# Patient Record
Sex: MALE | NOT HISPANIC OR LATINO | Employment: FULL TIME | URBAN - NONMETROPOLITAN AREA
[De-identification: names, ages, dates, MRNs, and addresses within clinical notes are randomized per-mention and may not be internally consistent; named-entity substitution may affect disease eponyms.]

---

## 2017-09-20 ENCOUNTER — NON-PROVIDER VISIT (OUTPATIENT)
Dept: URGENT CARE | Facility: PHYSICIAN GROUP | Age: 22
End: 2017-09-20

## 2017-09-20 DIAGNOSIS — Z02.1 DRUG SCREENING, PRE-EMPLOYMENT: ICD-10-CM

## 2017-09-20 LAB
BREATH ALCOHOL COMMENT: NORMAL
POC BREATHALIZER: 0 PERCENT (ref 0–0.01)

## 2017-09-20 PROCEDURE — 8907 PR URINE COLLECT ONLY: Performed by: PHYSICIAN ASSISTANT

## 2017-09-20 PROCEDURE — 82075 ASSAY OF BREATH ETHANOL: CPT | Performed by: PHYSICIAN ASSISTANT

## 2018-01-26 ENCOUNTER — OFFICE VISIT (OUTPATIENT)
Dept: URGENT CARE | Facility: PHYSICIAN GROUP | Age: 23
End: 2018-01-26
Payer: COMMERCIAL

## 2018-01-26 VITALS
BODY MASS INDEX: 24.38 KG/M2 | WEIGHT: 180 LBS | TEMPERATURE: 98.6 F | DIASTOLIC BLOOD PRESSURE: 76 MMHG | HEART RATE: 77 BPM | HEIGHT: 72 IN | SYSTOLIC BLOOD PRESSURE: 124 MMHG | RESPIRATION RATE: 16 BRPM | OXYGEN SATURATION: 98 %

## 2018-01-26 DIAGNOSIS — J01.40 ACUTE PANSINUSITIS, RECURRENCE NOT SPECIFIED: ICD-10-CM

## 2018-01-26 DIAGNOSIS — R05.9 COUGH: ICD-10-CM

## 2018-01-26 PROCEDURE — 99203 OFFICE O/P NEW LOW 30 MIN: CPT | Performed by: PHYSICIAN ASSISTANT

## 2018-01-26 RX ORDER — FLUTICASONE PROPIONATE 50 MCG
2 SPRAY, SUSPENSION (ML) NASAL DAILY
Qty: 1 BOTTLE | Refills: 1 | Status: SHIPPED | OUTPATIENT
Start: 2018-01-26 | End: 2018-10-01 | Stop reason: CLARIF

## 2018-01-26 RX ORDER — AMOXICILLIN AND CLAVULANATE POTASSIUM 875; 125 MG/1; MG/1
1 TABLET, FILM COATED ORAL 2 TIMES DAILY
Qty: 14 TAB | Refills: 0 | Status: SHIPPED | OUTPATIENT
Start: 2018-01-26 | End: 2018-02-02

## 2018-01-26 ASSESSMENT — ENCOUNTER SYMPTOMS
COUGH: 1
MYALGIAS: 0
SORE THROAT: 1
SHORTNESS OF BREATH: 0
RHINORRHEA: 1
CHILLS: 0
FEVER: 0
WHEEZING: 0
SINUS PAIN: 1

## 2018-01-26 NOTE — PATIENT INSTRUCTIONS
Use Tylenol and/or ibuprofen as needed for pain or fever.  Use a Kane Med, Neti Pot, or other nasal irrigation device daily.  Use Flonase daily.  May try a short course of a decongestant such as Sudafed.  May try Afrin nasal spray for 3-5 days and then discontinue use.  May try an over-the-counter antihistamine such as Zyrtec, Claritin, or Allegra.  Use a cool mist humidifier with distilled water.  Elevate the head of your bed a few inches.  Drink plenty of fluids and get adequate rest.  Follow up with primary care provider in a few days if not improving.  Return for new or worsening symptoms.      Sinusitis, Adult  Sinusitis is redness, soreness, and inflammation of the paranasal sinuses. Paranasal sinuses are air pockets within the bones of your face. They are located beneath your eyes, in the middle of your forehead, and above your eyes. In healthy paranasal sinuses, mucus is able to drain out, and air is able to circulate through them by way of your nose. However, when your paranasal sinuses are inflamed, mucus and air can become trapped. This can allow bacteria and other germs to grow and cause infection.  Sinusitis can develop quickly and last only a short time (acute) or continue over a long period (chronic). Sinusitis that lasts for more than 12 weeks is considered chronic.  CAUSES  Causes of sinusitis include:  · Allergies.  · Structural abnormalities, such as displacement of the cartilage that separates your nostrils (deviated septum), which can decrease the air flow through your nose and sinuses and affect sinus drainage.  · Functional abnormalities, such as when the small hairs (cilia) that line your sinuses and help remove mucus do not work properly or are not present.  SIGNS AND SYMPTOMS  Symptoms of acute and chronic sinusitis are the same. The primary symptoms are pain and pressure around the affected sinuses. Other symptoms include:  · Upper toothache.  · Earache.  · Headache.  · Bad  breath.  · Decreased sense of smell and taste.  · A cough, which worsens when you are lying flat.  · Fatigue.  · Fever.  · Thick drainage from your nose, which often is green and may contain pus (purulent).  · Swelling and warmth over the affected sinuses.  DIAGNOSIS  Your health care provider will perform a physical exam. During your exam, your health care provider may perform any of the following to help determine if you have acute sinusitis or chronic sinusitis:  · Look in your nose for signs of abnormal growths in your nostrils (nasal polyps).  · Tap over the affected sinus to check for signs of infection.  · View the inside of your sinuses using an imaging device that has a light attached (endoscope).  If your health care provider suspects that you have chronic sinusitis, one or more of the following tests may be recommended:  · Allergy tests.  · Nasal culture. A sample of mucus is taken from your nose, sent to a lab, and screened for bacteria.  · Nasal cytology. A sample of mucus is taken from your nose and examined by your health care provider to determine if your sinusitis is related to an allergy.  TREATMENT  Most cases of acute sinusitis are related to a viral infection and will resolve on their own within 10 days. Sometimes, medicines are prescribed to help relieve symptoms of both acute and chronic sinusitis. These may include pain medicines, decongestants, nasal steroid sprays, or saline sprays.  However, for sinusitis related to a bacterial infection, your health care provider will prescribe antibiotic medicines. These are medicines that will help kill the bacteria causing the infection.  Rarely, sinusitis is caused by a fungal infection. In these cases, your health care provider will prescribe antifungal medicine.  For some cases of chronic sinusitis, surgery is needed. Generally, these are cases in which sinusitis recurs more than 3 times per year, despite other treatments.  HOME CARE  INSTRUCTIONS  · Drink plenty of water. Water helps thin the mucus so your sinuses can drain more easily.  · Use a humidifier.  · Inhale steam 3-4 times a day (for example, sit in the bathroom with the shower running).  · Apply a warm, moist washcloth to your face 3-4 times a day, or as directed by your health care provider.  · Use saline nasal sprays to help moisten and clean your sinuses.  · Take medicines only as directed by your health care provider.  · If you were prescribed either an antibiotic or antifungal medicine, finish it all even if you start to feel better.  SEEK IMMEDIATE MEDICAL CARE IF:  · You have increasing pain or severe headaches.  · You have nausea, vomiting, or drowsiness.  · You have swelling around your face.  · You have vision problems.  · You have a stiff neck.  · You have difficulty breathing.     This information is not intended to replace advice given to you by your health care provider. Make sure you discuss any questions you have with your health care provider.     Document Released: 12/18/2006 Document Revised: 01/08/2016 Document Reviewed: 01/01/2013  [employer] Interactive Patient Education ©2016 [employer]  Cough, Adult   A cough is a reflex that helps clear your throat and airways. It can help heal the body or may be a reaction to an irritated airway. A cough may only last 2 or 3 weeks (acute) or may last more than 8 weeks (chronic).   CAUSES  Acute cough:  · Viral or bacterial infections.  Chronic cough:  · Infections.  · Allergies.  · Asthma.  · Post-nasal drip.  · Smoking.  · Heartburn or acid reflux.  · Some medicines.  · Chronic lung problems (COPD).  · Cancer.  SYMPTOMS   · Cough.  · Fever.  · Chest pain.  · Increased breathing rate.  · High-pitched whistling sound when breathing (wheezing).  · Colored mucus that you cough up (sputum).  TREATMENT   · A bacterial cough may be treated with antibiotic medicine.  · A viral cough must run its course and will not respond to  antibiotics.  · Your caregiver may recommend other treatments if you have a chronic cough.  HOME CARE INSTRUCTIONS   · Only take over-the-counter or prescription medicines for pain, discomfort, or fever as directed by your caregiver. Use cough suppressants only as directed by your caregiver.  · Use a cold steam vaporizer or humidifier in your bedroom or home to help loosen secretions.  · Sleep in a semi-upright position if your cough is worse at night.  · Rest as needed.  · Stop smoking if you smoke.  SEEK IMMEDIATE MEDICAL CARE IF:   · You have pus in your sputum.  · Your cough starts to worsen.  · You cannot control your cough with suppressants and are losing sleep.  · You begin coughing up blood.  · You have difficulty breathing.  · You develop pain which is getting worse or is uncontrolled with medicine.  · You have a fever.  MAKE SURE YOU:   · Understand these instructions.  · Will watch your condition.  · Will get help right away if you are not doing well or get worse.     This information is not intended to replace advice given to you by your health care provider. Make sure you discuss any questions you have with your health care provider.     Document Released: 06/15/2012 Document Revised: 03/11/2013 Document Reviewed: 02/24/2016  [employer] Interactive Patient Education ©2016 [employer]

## 2018-01-26 NOTE — PROGRESS NOTES
Subjective:      Jacky Nolan is a 22 y.o. male who presents with Cough (x1mon congestion, headache, note for work)            One-month history of rhinorrhea, congestion, cough. Also reports significant sinus pressure/pain and sore throat. Symptoms fluctuating not improving. He needs a note for work.      Cough   This is a new problem. The current episode started 1 to 4 weeks ago. The problem has been waxing and waning. The cough is non-productive. Associated symptoms include nasal congestion, rhinorrhea and a sore throat. Pertinent negatives include no chills, fever, myalgias, shortness of breath or wheezing. Nothing aggravates the symptoms. He has tried nothing for the symptoms. The treatment provided no relief.       Review of Systems   Constitutional: Negative for chills and fever.   HENT: Positive for congestion, rhinorrhea, sinus pain and sore throat.    Respiratory: Positive for cough. Negative for shortness of breath and wheezing.    Musculoskeletal: Negative for myalgias.     Allergies:Patient has no known allergies.    Current Outpatient Prescriptions Ordered in [facility]   Medication Sig Dispense Refill   • amoxicillin-clavulanate (AUGMENTIN) 875-125 MG Tab Take 1 Tab by mouth 2 times a day for 7 days. 14 Tab 0   • fluticasone (FLONASE) 50 MCG/ACT nasal spray Spray 2 Sprays in nose every day. 1 Bottle 1     No current Epic-ordered facility-administered medications on file.        History reviewed. No pertinent past medical history.    Social History   Substance Use Topics   • Smoking status: Never Smoker   • Smokeless tobacco: Current User     Types: Chew   • Alcohol use No       No family status information on file.   History reviewed. No pertinent family history.       Objective:     /76   Pulse 77   Temp 37 °C (98.6 °F)   Resp 16   Ht 1.829 m (6')   Wt 81.6 kg (180 lb)   SpO2 98%   BMI 24.41 kg/m²      Physical Exam   Constitutional: He is oriented to person, place, and time. He appears  well-developed and well-nourished. No distress.   HENT:   Head: Normocephalic and atraumatic.   Right Ear: External ear normal.   Left Ear: External ear normal.   Canals and tympanic membranes unremarkable. Nasal mucosal edema frontal/macular sinus tenderness. Posterior pharynx is erythematous, edematous, with exudate   Eyes: Right eye exhibits no discharge. Left eye exhibits no discharge.   Neck: Normal range of motion. Neck supple.   Cardiovascular: Normal rate and regular rhythm.    Pulmonary/Chest: Effort normal and breath sounds normal. He has no wheezes. He has no rales.   Nonproductive cough   Lymphadenopathy:     He has no cervical adenopathy.   Neurological: He is alert and oriented to person, place, and time.   Skin: Skin is warm and dry. He is not diaphoretic.   Psychiatric: He has a normal mood and affect. His behavior is normal. Judgment and thought content normal.   Nursing note and vitals reviewed.              Assessment/Plan:     1. Acute pansinusitis, recurrence not specified  amoxicillin-clavulanate (AUGMENTIN) 875-125 MG Tab    fluticasone (FLONASE) 50 MCG/ACT nasal spray    Ongoing for a month. Nasal mucosal edema with frontal/macular sinus tenderness. Start Augmentin, Flonase, irrigation. Follow-up PCP   2. Cough      Fluctuating for a couple of weeks. Lungs clear. Given written instructions. Follow-up with PCP as needed       [employer] Interactive Patient Education given: Sinusitis    Use Tylenol and/or ibuprofen as needed for pain or fever.  Use a Kane Med, Neti Pot, or other nasal irrigation device daily.  Use Flonase daily.  May try a short course of a decongestant such as Sudafed.  May try Afrin nasal spray for 3-5 days and then discontinue use.  May try an over-the-counter antihistamine such as Zyrtec, Claritin, or Allegra.  Use a cool mist humidifier with distilled water.  Elevate the head of your bed a few inches.  Drink plenty of fluids and get adequate rest.  Follow up with primary  care provider in a few days if not improving.  Return for new or worsening symptoms.      Please note that this dictation was created using voice recognition software. I have made every reasonable attempt to correct obvious errors, but I expect that there are errors of grammar and possibly content that I did not discover before finalizing the note.

## 2018-01-26 NOTE — LETTER
January 26, 2018         Patient: Jacky Nolan   YOB: 1995   Date of Visit: 1/26/2018           To Whom it May Concern:    Jacky Nolan was seen in my clinic on 1/26/2018. Please excuse from work today and tomorrow due to illness. He may return when symptoms improve.    If you have any questions or concerns, please don't hesitate to call.        Sincerely,           Zaheer Self PA-C  Electronically Signed

## 2018-10-01 ENCOUNTER — HOSPITAL ENCOUNTER (EMERGENCY)
Facility: MEDICAL CENTER | Age: 23
End: 2018-10-02
Attending: EMERGENCY MEDICINE
Payer: COMMERCIAL

## 2018-10-01 DIAGNOSIS — R10.13 EPIGASTRIC PAIN: ICD-10-CM

## 2018-10-01 LAB — EKG IMPRESSION: NORMAL

## 2018-10-01 PROCEDURE — 93005 ELECTROCARDIOGRAM TRACING: CPT

## 2018-10-01 PROCEDURE — 36415 COLL VENOUS BLD VENIPUNCTURE: CPT

## 2018-10-01 PROCEDURE — 93005 ELECTROCARDIOGRAM TRACING: CPT | Performed by: EMERGENCY MEDICINE

## 2018-10-01 PROCEDURE — 83690 ASSAY OF LIPASE: CPT

## 2018-10-01 PROCEDURE — 99285 EMERGENCY DEPT VISIT HI MDM: CPT

## 2018-10-01 PROCEDURE — 80053 COMPREHEN METABOLIC PANEL: CPT

## 2018-10-01 PROCEDURE — 85025 COMPLETE CBC W/AUTO DIFF WBC: CPT

## 2018-10-01 ASSESSMENT — PAIN SCALES - GENERAL: PAINLEVEL_OUTOF10: 10

## 2018-10-02 ENCOUNTER — APPOINTMENT (OUTPATIENT)
Dept: RADIOLOGY | Facility: MEDICAL CENTER | Age: 23
End: 2018-10-02
Attending: EMERGENCY MEDICINE
Payer: COMMERCIAL

## 2018-10-02 VITALS
SYSTOLIC BLOOD PRESSURE: 121 MMHG | WEIGHT: 171.08 LBS | BODY MASS INDEX: 23.17 KG/M2 | RESPIRATION RATE: 19 BRPM | HEIGHT: 72 IN | OXYGEN SATURATION: 90 % | HEART RATE: 47 BPM | DIASTOLIC BLOOD PRESSURE: 63 MMHG | TEMPERATURE: 97.2 F

## 2018-10-02 LAB
ALBUMIN SERPL BCP-MCNC: 4.8 G/DL (ref 3.2–4.9)
ALBUMIN/GLOB SERPL: 1.7 G/DL
ALP SERPL-CCNC: 57 U/L (ref 30–99)
ALT SERPL-CCNC: 14 U/L (ref 2–50)
ANION GAP SERPL CALC-SCNC: 11 MMOL/L (ref 0–11.9)
AST SERPL-CCNC: 22 U/L (ref 12–45)
BASOPHILS # BLD AUTO: 1 % (ref 0–1.8)
BASOPHILS # BLD: 0.11 K/UL (ref 0–0.12)
BILIRUB SERPL-MCNC: 0.7 MG/DL (ref 0.1–1.5)
BUN SERPL-MCNC: 26 MG/DL (ref 8–22)
CALCIUM SERPL-MCNC: 9.7 MG/DL (ref 8.5–10.5)
CHLORIDE SERPL-SCNC: 105 MMOL/L (ref 96–112)
CO2 SERPL-SCNC: 25 MMOL/L (ref 20–33)
CREAT SERPL-MCNC: 1.46 MG/DL (ref 0.5–1.4)
EOSINOPHIL # BLD AUTO: 0.52 K/UL (ref 0–0.51)
EOSINOPHIL NFR BLD: 4.5 % (ref 0–6.9)
ERYTHROCYTE [DISTWIDTH] IN BLOOD BY AUTOMATED COUNT: 42.4 FL (ref 35.9–50)
GLOBULIN SER CALC-MCNC: 2.8 G/DL (ref 1.9–3.5)
GLUCOSE SERPL-MCNC: 94 MG/DL (ref 65–99)
HCT VFR BLD AUTO: 45.4 % (ref 42–52)
HGB BLD-MCNC: 15.8 G/DL (ref 14–18)
IMM GRANULOCYTES # BLD AUTO: 0.06 K/UL (ref 0–0.11)
IMM GRANULOCYTES NFR BLD AUTO: 0.5 % (ref 0–0.9)
LIPASE SERPL-CCNC: 31 U/L (ref 11–82)
LYMPHOCYTES # BLD AUTO: 1.75 K/UL (ref 1–4.8)
LYMPHOCYTES NFR BLD: 15.2 % (ref 22–41)
MCH RBC QN AUTO: 30.4 PG (ref 27–33)
MCHC RBC AUTO-ENTMCNC: 34.8 G/DL (ref 33.7–35.3)
MCV RBC AUTO: 87.3 FL (ref 81.4–97.8)
MONOCYTES # BLD AUTO: 0.65 K/UL (ref 0–0.85)
MONOCYTES NFR BLD AUTO: 5.6 % (ref 0–13.4)
NEUTROPHILS # BLD AUTO: 8.43 K/UL (ref 1.82–7.42)
NEUTROPHILS NFR BLD: 73.2 % (ref 44–72)
NRBC # BLD AUTO: 0.05 K/UL
NRBC BLD-RTO: 0.4 /100 WBC
PLATELET # BLD AUTO: 182 K/UL (ref 164–446)
PMV BLD AUTO: 11.7 FL (ref 9–12.9)
POTASSIUM SERPL-SCNC: 3.9 MMOL/L (ref 3.6–5.5)
PROT SERPL-MCNC: 7.6 G/DL (ref 6–8.2)
RBC # BLD AUTO: 5.2 M/UL (ref 4.7–6.1)
SODIUM SERPL-SCNC: 141 MMOL/L (ref 135–145)
WBC # BLD AUTO: 11.5 K/UL (ref 4.8–10.8)

## 2018-10-02 PROCEDURE — 700111 HCHG RX REV CODE 636 W/ 250 OVERRIDE (IP): Performed by: EMERGENCY MEDICINE

## 2018-10-02 PROCEDURE — A9270 NON-COVERED ITEM OR SERVICE: HCPCS | Performed by: EMERGENCY MEDICINE

## 2018-10-02 PROCEDURE — 74022 RADEX COMPL AQT ABD SERIES: CPT

## 2018-10-02 PROCEDURE — 96374 THER/PROPH/DIAG INJ IV PUSH: CPT

## 2018-10-02 PROCEDURE — 700102 HCHG RX REV CODE 250 W/ 637 OVERRIDE(OP): Performed by: EMERGENCY MEDICINE

## 2018-10-02 RX ORDER — ONDANSETRON 4 MG/1
4 TABLET, ORALLY DISINTEGRATING ORAL EVERY 6 HOURS PRN
Qty: 20 TAB | Refills: 0 | Status: SHIPPED | OUTPATIENT
Start: 2018-10-02

## 2018-10-02 RX ORDER — FAMOTIDINE 20 MG/1
20 TABLET, FILM COATED ORAL ONCE
Status: COMPLETED | OUTPATIENT
Start: 2018-10-02 | End: 2018-10-02

## 2018-10-02 RX ORDER — OMEPRAZOLE 20 MG/1
20 CAPSULE, DELAYED RELEASE ORAL DAILY
Qty: 30 CAP | Refills: 0 | Status: SHIPPED | OUTPATIENT
Start: 2018-10-02

## 2018-10-02 RX ORDER — OMEPRAZOLE 20 MG/1
20 CAPSULE, DELAYED RELEASE ORAL DAILY
Qty: 30 CAP | Refills: 0 | Status: SHIPPED | OUTPATIENT
Start: 2018-10-02 | End: 2018-10-02

## 2018-10-02 RX ORDER — ONDANSETRON 2 MG/ML
4 INJECTION INTRAMUSCULAR; INTRAVENOUS ONCE
Status: COMPLETED | OUTPATIENT
Start: 2018-10-02 | End: 2018-10-02

## 2018-10-02 RX ORDER — DIPHENHYDRAMINE HCL 25 MG
50 TABLET ORAL ONCE
Status: COMPLETED | OUTPATIENT
Start: 2018-10-02 | End: 2018-10-02

## 2018-10-02 RX ORDER — ALUMINA, MAGNESIA, AND SIMETHICONE 2400; 2400; 240 MG/30ML; MG/30ML; MG/30ML
10 SUSPENSION ORAL 4 TIMES DAILY PRN
Qty: 560 ML | Refills: 0 | Status: SHIPPED | OUTPATIENT
Start: 2018-10-02

## 2018-10-02 RX ADMIN — LIDOCAINE HYDROCHLORIDE 30 ML: 20 SOLUTION OROPHARYNGEAL at 01:26

## 2018-10-02 RX ADMIN — DIPHENHYDRAMINE HCL 50 MG: 25 TABLET ORAL at 00:45

## 2018-10-02 RX ADMIN — FAMOTIDINE 20 MG: 20 TABLET, FILM COATED ORAL at 00:45

## 2018-10-02 RX ADMIN — ONDANSETRON 4 MG: 2 INJECTION INTRAMUSCULAR; INTRAVENOUS at 00:45

## 2018-10-02 RX ADMIN — LIDOCAINE HYDROCHLORIDE 30 ML: 20 SOLUTION OROPHARYNGEAL at 00:44

## 2018-10-02 ASSESSMENT — ENCOUNTER SYMPTOMS
VOMITING: 1
ABDOMINAL PAIN: 1
HEADACHES: 0
CHILLS: 0
NAUSEA: 1
SORE THROAT: 0
FEVER: 0
SHORTNESS OF BREATH: 0

## 2018-10-02 ASSESSMENT — LIFESTYLE VARIABLES
TOTAL SCORE: 0
DO YOU DRINK ALCOHOL: YES
TOTAL SCORE: 0
CONSUMPTION TOTAL: INCOMPLETE
HAVE YOU EVER FELT YOU SHOULD CUT DOWN ON YOUR DRINKING: NO
EVER HAD A DRINK FIRST THING IN THE MORNING TO STEADY YOUR NERVES TO GET RID OF A HANGOVER: NO
EVER FELT BAD OR GUILTY ABOUT YOUR DRINKING: NO
HAVE PEOPLE ANNOYED YOU BY CRITICIZING YOUR DRINKING: NO
TOTAL SCORE: 0

## 2018-10-02 NOTE — ED TRIAGE NOTES
"Chief Complaint   Patient presents with   • Epigastric Pain     Began approx 1 hr ago, radiating to chest, burning/sharp pain. Pt being actively treated for stomach ulcers, \"it feels different though because of how much pain I'm in.\"    • Tired     \"I can't open my eyes without feeling like I'm going to pass out.\"      /63   Pulse 61   Temp 36.2 °C (97.2 °F)   Resp 18   Ht 1.829 m (6')   Wt 77.6 kg (171 lb 1.2 oz)   SpO2 100%   BMI 23.20 kg/m²     Pt to triage via WC, able to stand for weight. EKG completed. In obvious discomfort, sitting with eyes closed and grimacing. Lab protocol ordered and with tech to have drawn.   "

## 2018-10-02 NOTE — DISCHARGE INSTRUCTIONS
Your kidney function tests were elevated because of dehydration, please have this rechecked by PCP

## 2018-10-02 NOTE — ED PROVIDER NOTES
"ED Provider Note    Scribed for Megan Loaiza M.D. by Jennifer Pritchard. 10/1/2018, 11:58 PM.    Primary care provider: Pcp Pt States None  Means of arrival: wheel chair  History obtained from: patient  History limited by: none    CHIEF COMPLAINT  Chief Complaint   Patient presents with   • Epigastric Pain     Began approx 1 hr ago, radiating to chest, burning/sharp pain. Pt being actively treated for stomach ulcers, \"it feels different though because of how much pain I'm in.\"    • Tired     \"I can't open my eyes without feeling like I'm going to pass out.\"        HPI  Jacky Nolan is a 23 y.o. male  who presents to the Emergency Department with epigastric pain onset 2 hours ago. The patient states that he went to his PCP for similar pain before and was diagnosed with stomach ulcers a couple of weeks ago. He was empirically started on antibiotics for H. pylori   However has been unable to tolerate them the past few days secondary to nausea and vomiting. He states the pain is in his epigastric region and is burning and moderate in severity.  He denies fever, chills, any recent surgeries, or medications.     REVIEW OF SYSTEMS  Review of Systems   Constitutional: Negative for chills and fever.   HENT: Negative for sore throat.    Respiratory: Negative for shortness of breath.    Cardiovascular: Negative for chest pain.   Gastrointestinal: Positive for abdominal pain, nausea and vomiting.   Neurological: Negative for headaches.     All other systems negative.    PAST MEDICAL HISTORY   Stomach ulcers and asthma.     SURGICAL HISTORY  patient denies any surgical history    SOCIAL HISTORY  Social History   Substance Use Topics   • Smoking status: Never Smoker   • Smokeless tobacco: Current User     Types: Chew   • Alcohol use Yes      Comment: socially      History   Drug Use     Comment: 3 yrs ago use of pills       FAMILY HISTORY  History reviewed. No pertinent family history.    CURRENT MEDICATIONS  Unknown " Antibiotics    ALLERGIES  No Known Allergies    PHYSICAL EXAM  VITAL SIGNS: /63   Pulse 61   Temp 36.2 °C (97.2 °F)   Resp 18   Ht 1.829 m (6')   Wt 77.6 kg (171 lb 1.2 oz)   SpO2 100%   BMI 23.20 kg/m²   Vitals reviewed by myself.  Physical Exam   Nursing note and vitals reviewed.  Constitutional: Well-developed and well-nourished. No acute distress.   HENT: Head is normocephalic and atraumatic.  Eyes: extra-ocular movements intact  Cardiovascular: Normal rate and regular rhythm. No murmur heard.  Pulmonary/Chest: Breath sounds normal. No wheezes or rales.   Abdominal: Soft with mild epigastric tenderness. No distention.  Negative Loaiza's sign, no rebound, no guarding  Musculoskeletal: Extremities exhibit normal range of motion without edema or tenderness.   Neurological: Awake and alert  Skin: Skin is warm and dry. No rash.     DIAGNOSTIC STUDIES /  LABS  Labs Reviewed   CBC WITH DIFFERENTIAL - Abnormal; Notable for the following:        Result Value    WBC 11.5 (*)     Neutrophils-Polys 73.20 (*)     Lymphocytes 15.20 (*)     Neutrophils (Absolute) 8.43 (*)     Eos (Absolute) 0.52 (*)     All other components within normal limits   COMP METABOLIC PANEL - Abnormal; Notable for the following:     Bun 26 (*)     Creatinine 1.46 (*)     All other components within normal limits   LIPASE   ESTIMATED GFR       All labs reviewed by me.        RADIOLOGY  DX-ABDOMEN COMPLETE WITH AP OR PA CXR   Final Result         1.  No acute cardiopulmonary disease is evident.   2.  Nonspecific bowel gas pattern.        The radiologist's interpretation of all radiological studies have been reviewed by me.        COURSE & MEDICAL DECISION MAKING  Nursing notes, VS, PMSFHx reviewed in chart.    Patient is a 23 year old male who comes in for abdominal pain. Differential diagnosis includes PUD, gastritis, pancreatitis, cholecystitis, cholelithiasis, perforated viscous. Diagnostic work-up includes labs and abdominal  xray.    On initial assessment patient is well appearing with mild epigastric tenderness, vitals are within normal limits. He is treated with GI cocktail, zofran and famotidine. Xray returns and demonstrates no free air. Labs are unremarkable. Lipase is within normal limits. Upon reassessment patient is feeling improved, tolerating oral intake and repeat abdominal exam is benign. I believe cholecystitis is unlikely given normal exam and patient's improvement in symptoms. As he has had this ongoing epigastric pain I advised him that this is likely gastritis versus PUD, and he should follow-up with gastroenterologist for definitive management. In the meantime I have advised him to start daily omeprazole, I have also provided prescriptions for Zofran and Maalox for breakthrough symptoms. Patient is agreeable to this plan. He is then given strict return precautions and discharged home in stable condition.       FINAL IMPRESSION  1. Epigastric pain Active         Jennifer HADLEY (Joseibstar), am scribing for, and in the presence of, Megan Loaiza M.D..    Electronically signed by: Jennifer Pritchard (Wendie), 10/1/2018    Megan HADLEY M.D. personally performed the services described in this documentation, as scribed by Jennifer Pritchard in my presence, and it is both accurate and complete. C.    The note accurately reflects work and decisions made by me.  Megan Loaiza  10/2/2018  1:15 PM

## 2018-10-05 ENCOUNTER — APPOINTMENT (OUTPATIENT)
Dept: RADIOLOGY | Facility: MEDICAL CENTER | Age: 23
End: 2018-10-05
Attending: EMERGENCY MEDICINE
Payer: COMMERCIAL

## 2018-10-05 ENCOUNTER — HOSPITAL ENCOUNTER (EMERGENCY)
Facility: MEDICAL CENTER | Age: 23
End: 2018-10-05
Attending: EMERGENCY MEDICINE
Payer: COMMERCIAL

## 2018-10-05 VITALS
TEMPERATURE: 97.2 F | BODY MASS INDEX: 22.38 KG/M2 | OXYGEN SATURATION: 96 % | HEIGHT: 73 IN | SYSTOLIC BLOOD PRESSURE: 111 MMHG | HEART RATE: 57 BPM | DIASTOLIC BLOOD PRESSURE: 62 MMHG | WEIGHT: 168.87 LBS | RESPIRATION RATE: 16 BRPM

## 2018-10-05 DIAGNOSIS — R10.13 EPIGASTRIC PAIN: ICD-10-CM

## 2018-10-05 LAB
ALBUMIN SERPL BCP-MCNC: 4.9 G/DL (ref 3.2–4.9)
ALBUMIN/GLOB SERPL: 1.8 G/DL
ALP SERPL-CCNC: 58 U/L (ref 30–99)
ALT SERPL-CCNC: 12 U/L (ref 2–50)
ANION GAP SERPL CALC-SCNC: 7 MMOL/L (ref 0–11.9)
APPEARANCE UR: CLEAR
AST SERPL-CCNC: 16 U/L (ref 12–45)
BACTERIA #/AREA URNS HPF: NEGATIVE /HPF
BASOPHILS # BLD AUTO: 0.6 % (ref 0–1.8)
BASOPHILS # BLD: 0.04 K/UL (ref 0–0.12)
BILIRUB SERPL-MCNC: 0.8 MG/DL (ref 0.1–1.5)
BILIRUB UR QL STRIP.AUTO: NEGATIVE
BUN SERPL-MCNC: 16 MG/DL (ref 8–22)
CALCIUM SERPL-MCNC: 10 MG/DL (ref 8.5–10.5)
CHLORIDE SERPL-SCNC: 103 MMOL/L (ref 96–112)
CO2 SERPL-SCNC: 28 MMOL/L (ref 20–33)
COLOR UR: YELLOW
CREAT SERPL-MCNC: 1.25 MG/DL (ref 0.5–1.4)
EOSINOPHIL # BLD AUTO: 0.66 K/UL (ref 0–0.51)
EOSINOPHIL NFR BLD: 9.4 % (ref 0–6.9)
EPI CELLS #/AREA URNS HPF: NEGATIVE /HPF
ERYTHROCYTE [DISTWIDTH] IN BLOOD BY AUTOMATED COUNT: 42.6 FL (ref 35.9–50)
GLOBULIN SER CALC-MCNC: 2.7 G/DL (ref 1.9–3.5)
GLUCOSE SERPL-MCNC: 85 MG/DL (ref 65–99)
GLUCOSE UR STRIP.AUTO-MCNC: NEGATIVE MG/DL
HCT VFR BLD AUTO: 48.6 % (ref 42–52)
HGB BLD-MCNC: 16.3 G/DL (ref 14–18)
HYALINE CASTS #/AREA URNS LPF: ABNORMAL /LPF
IMM GRANULOCYTES # BLD AUTO: 0.01 K/UL (ref 0–0.11)
IMM GRANULOCYTES NFR BLD AUTO: 0.1 % (ref 0–0.9)
KETONES UR STRIP.AUTO-MCNC: NEGATIVE MG/DL
LEUKOCYTE ESTERASE UR QL STRIP.AUTO: ABNORMAL
LIPASE SERPL-CCNC: 19 U/L (ref 11–82)
LYMPHOCYTES # BLD AUTO: 1.45 K/UL (ref 1–4.8)
LYMPHOCYTES NFR BLD: 20.7 % (ref 22–41)
MCH RBC QN AUTO: 29.5 PG (ref 27–33)
MCHC RBC AUTO-ENTMCNC: 33.5 G/DL (ref 33.7–35.3)
MCV RBC AUTO: 87.9 FL (ref 81.4–97.8)
MICRO URNS: ABNORMAL
MONOCYTES # BLD AUTO: 0.44 K/UL (ref 0–0.85)
MONOCYTES NFR BLD AUTO: 6.3 % (ref 0–13.4)
NEUTROPHILS # BLD AUTO: 4.41 K/UL (ref 1.82–7.42)
NEUTROPHILS NFR BLD: 62.9 % (ref 44–72)
NITRITE UR QL STRIP.AUTO: NEGATIVE
NRBC # BLD AUTO: 0 K/UL
NRBC BLD-RTO: 0 /100 WBC
PH UR STRIP.AUTO: 6.5 [PH]
PLATELET # BLD AUTO: 185 K/UL (ref 164–446)
PMV BLD AUTO: 11.5 FL (ref 9–12.9)
POTASSIUM SERPL-SCNC: 4.3 MMOL/L (ref 3.6–5.5)
PROT SERPL-MCNC: 7.6 G/DL (ref 6–8.2)
PROT UR QL STRIP: NEGATIVE MG/DL
RBC # BLD AUTO: 5.53 M/UL (ref 4.7–6.1)
RBC # URNS HPF: ABNORMAL /HPF
RBC UR QL AUTO: NEGATIVE
SODIUM SERPL-SCNC: 138 MMOL/L (ref 135–145)
SP GR UR STRIP.AUTO: 1.01
UROBILINOGEN UR STRIP.AUTO-MCNC: 0.2 MG/DL
WBC # BLD AUTO: 7 K/UL (ref 4.8–10.8)
WBC #/AREA URNS HPF: ABNORMAL /HPF

## 2018-10-05 PROCEDURE — 83690 ASSAY OF LIPASE: CPT

## 2018-10-05 PROCEDURE — 85025 COMPLETE CBC W/AUTO DIFF WBC: CPT

## 2018-10-05 PROCEDURE — 87491 CHLMYD TRACH DNA AMP PROBE: CPT

## 2018-10-05 PROCEDURE — 99284 EMERGENCY DEPT VISIT MOD MDM: CPT

## 2018-10-05 PROCEDURE — 87591 N.GONORRHOEAE DNA AMP PROB: CPT

## 2018-10-05 PROCEDURE — 80053 COMPREHEN METABOLIC PANEL: CPT

## 2018-10-05 PROCEDURE — 81001 URINALYSIS AUTO W/SCOPE: CPT

## 2018-10-05 PROCEDURE — 87086 URINE CULTURE/COLONY COUNT: CPT

## 2018-10-05 PROCEDURE — 74177 CT ABD & PELVIS W/CONTRAST: CPT

## 2018-10-05 NOTE — ED TRIAGE NOTES
"Chief Complaint   Patient presents with   • Abdominal Pain       Patient ambulatory to triage with above complaint. Patient states he was here several days ago for the same. Patient states the pain is still and needs to get back to work. Patient states \"he has ulcers and has few and far between dark stools\". Patient placed back out in lobby and educated on triage process.   "

## 2018-10-05 NOTE — ED PROVIDER NOTES
ED Provider Note    Chief Complaint:   Abdominal pain    HPI:  Jacky Nolan is a 23 y.o. male who presents with abdominal pain.  He reports a chronic pain that has been ongoing for several weeks, intermittently worsening and improving.  He was seen in this emergency department 5 days ago with similar pain.  Treated with Pepcid, Zofran, and discharged with instructions to follow-up with gastroenterology.  He does have an appointment scheduled for October 18 with [facility].  He had previously attempted to treat his pain with empiric antibiotics for H. pylori, states he was only able to take 3 days of those medications due to nausea and vomiting.    His pain worsened today, he describes epigastric pain radiating to the left upper quadrant.  He has had some associated nausea without vomiting.  He has also had decreased frequency of stooling, and describes dark stools as well.  He has not had any bloody stools.  He denies any associated fevers.  His exacerbation of pain today is identical to prior exacerbations both in severity and quality.  He reports no lower abdominal pain, no right lower quadrant pain, no testicular pain.  He reports no dysuria, no hematuria. States his pain has previously been exacerbated by using kratom, however he did not use this today.  He denies any alleviating factors.    He has no prior history of impaired immunity, no history of heavy NSAID use in the recent past, no history of bleeding disorder.    Review of Systems:  See HPI for pertinent positives and negatives. All other systems negative.    Past Medical History:       Social History:  Social History     Social History Main Topics   • Smoking status: Never Smoker   • Smokeless tobacco: Current User     Types: Chew   • Alcohol use Yes      Comment: socially   • Drug use: Yes      Comment: 3 yrs ago use of pills   • Sexual activity: Not on file       Surgical History:  patient denies any surgical history    Current Medications:  Home  "Medications     Reviewed by Christina Phillips R.N. (Registered Nurse) on 10/05/18 at 1421  Med List Status: Complete   Medication Last Dose Status   mag hydrox-al hydrox-simeth (MAALOX PLUS ES OR MYLANTA DS) 400-400-40 MG/5ML Suspension  Active   omeprazole (PRILOSEC) 20 MG delayed-release capsule 10/5/2018 Active   ondansetron (ZOFRAN ODT) 4 MG TABLET DISPERSIBLE 10/5/2018 Active                Allergies:  No Known Allergies    Physical Exam:  Vital Signs: /62   Pulse (!) 57   Temp 36.2 °C (97.2 °F)   Resp 16   Ht 1.854 m (6' 1\")   Wt 76.6 kg (168 lb 14 oz)   SpO2 96%   BMI 22.28 kg/m²   Constitutional: Alert, no acute distress  HENT: Moist mucus membranes, normal posterior pharynx, no intraoral lesions  Eyes: Pupils equal and reactive, normal conjunctiva  Neck: Supple, normal range of motion, no stridor  Cardiovascular: Extremities are warm and well perfused, no murmur appreciated, normal cardiac auscultation  Pulmonary: No respiratory distress, normal work of breathing, no accessory muscule usage, breath sounds clear and equal bilaterally  Abdomen: Soft, nondistended, mild tenderness to palpation in the epigastric area as well as left upper quadrant, no rebound, no guarding, spleen is not palpable.  Skin: Warm, dry, no rashes or lesions  Musculoskeletal: Normal range of motion in all extremities, no swelling or deformity noted  Neurologic: Alert, oriented, normal speech, normal motor function  Psychiatric: Normal and appropriate mood and affect    Medical records reviewed for continuity of care.  Patient seen in this emergency department 10/1/18 for epigastric pain.  Reports a prior visit to his primary care physician for similar pain.  He was diagnosed with stomach ulcers, empirically started on antibiotics for H. pylori.  Reports unable to tolerate the antibiotics over the past few days due to nausea and vomiting.  He was treated with a GI cocktail, Zofran, and famotidine.  X-ray demonstrates no " free air.  He is referred to gastroenterology for follow-up.    Labs:  Labs Reviewed   CBC WITH DIFFERENTIAL - Abnormal; Notable for the following:        Result Value    MCHC 33.5 (*)     Lymphocytes 20.70 (*)     Eosinophils 9.40 (*)     Eos (Absolute) 0.66 (*)     All other components within normal limits   URINALYSIS,CULTURE IF INDICATED - Abnormal; Notable for the following:     Leukocyte Esterase Small (*)     All other components within normal limits   URINE MICROSCOPIC (W/UA) - Abnormal; Notable for the following:     WBC 10-20 (*)     RBC 0-2 (*)     All other components within normal limits   COMP METABOLIC PANEL   LIPASE   URINE CULTURE(NEW)   ESTIMATED GFR       Radiology:  CT-ABDOMEN-PELVIS WITH   Final Result      Trace nonspecific free fluid in the pelvis.      Moderate amount of colonic stool.             ED Medications Administered:  Medications - No data to display    Differential diagnosis:  Peptic ulcer disease, GERD, gastritis, pancreatitis, cholecystitis, cholelithiasis, colitis    MDM:  Patient presents for persistent epigastric pain for the past several weeks.  He has been seen by his primary care physician, as well as this emergency department with similar pain.  He does have a follow-up appointment with gastroenterology scheduled in a week and a half.  On my physical exam, he has no tachycardia, no hypotension, no fevers, no evidence of Sirs or sepsis.  His abdominal exam is benign with only mild discomfort on epigastric palpation.  There is no pain or tenderness in the right upper quadrant, doubt biliary etiology.    On laboratory evaluation CMP and lipase are entirely within normal limits.  He has a normal white blood count.  No elevated neutrophils.  Urinalysis is significant for 10-20 white blood cells.  Patient has no urinary symptoms, denies penile discharge.  Hemoglobin has increased from prior visit, no evidence of significant GI bleed on laboratory evaluation.    CT abdomen pelvis  performed with contrast.  Pancreas remains unremarkable, no evidence of pancreatitis, normal lipase.  Visualized appendix is unremarkable.  No evidence of bowel obstruction, moderate amount of colonic stool.    At this time, etiology of his epigastric pain is unclear.  In the setting of sterile pyuria, there is concern for appendicitis, however he has no lower abdominal pain and the appendix is normal on CT scan.  Moderate colonic stool and moderately distended bladder are noted, the patient has no low back pain, no incontinence, no lower extremity weakness, no symptoms of cauda equina.  This is likely unrelated to his epigastric discomfort.    Plan at this time is for discharge home with gastroenterology follow-up.  He is encouraged to discontinue recreational drugs and supplements.  Counseled on drinking plenty of fluids, recommended that he drink 64 ounces of fluid daily at a minimum to assist his constipation.  He will contact gastroenterology consultants in the morning for a follow-up appointment to see if they may schedule an earlier appointment.    Patient's abdominal exam remains reassuring. At this time I do not believe admission is necessary for further diagnostics or treatment. Plan at this time is for discharge home. Patient will follow up with gastroenterology in 8-12 hours for abdominal recheck, instructed to call at the opening of business hours to schedule this appointment. If symptoms have not completely resolved and patient is unable to follow up with primary care or gastroenterology, plan is for return to the emergency department in 8-12 hours for abdominal recheck. Patient agrees to return immediately with any new or worsening symptoms including recurrent or worsening pain, nausea or vomiting, inability to tolerate fluids, fevers, or any further concerns.     Blood pressure today is greater than 120/80, patient is instructed to follow up with primary care provider for blood pressure  recheck.    Disposition:  Discharged home in stable condition    Final Impression:  1. Epigastric pain        Electronically signed by: Yolanda Velasco, 10/5/2018 6:49 PM

## 2018-10-06 NOTE — DISCHARGE INSTRUCTIONS
Please follow up with your primary care physician in 12 hours for abdominal recheck if your symptoms have not completely gone away. Call your primary care physician at the opening of business hours to let them know you were seen in the emergency department. Return immediately if your pain returns or worsens, if you develop any new symptoms, if you are not able to drink fluids, if you have persistent vomiting, if you develop fevers, or if you have any further concerns. Additionally, please return if your symptoms have not resolved and you are unable to follow up with your primary care physician for recheck.

## 2018-10-07 LAB
BACTERIA UR CULT: NORMAL
C TRACH DNA SPEC QL NAA+PROBE: POSITIVE
N GONORRHOEA DNA SPEC QL NAA+PROBE: NEGATIVE
SIGNIFICANT IND 70042: NORMAL
SITE SITE: NORMAL
SOURCE SOURCE: NORMAL
SPECIMEN SOURCE: ABNORMAL

## 2018-10-09 RX ORDER — AZITHROMYCIN 500 MG/1
1000 TABLET, FILM COATED ORAL ONCE
Qty: 2 TAB | Refills: 0 | Status: SHIPPED | OUTPATIENT
Start: 2018-10-09 | End: 2018-10-09

## 2018-10-09 NOTE — ED NOTES
"ED Positive Culture Follow-up/Notification Note:    Date: 10/9/18     Patient seen in the ED on 10/5/2018 for abdominal pain with previous visits for the same.  .   1. Epigastric pain       Discharge Medication List as of 10/5/2018  5:36 PM          Allergies: Patient has no known allergies.     Vitals:    10/05/18 1346 10/05/18 1419 10/05/18 1745   BP: 125/60  111/62   Pulse: 60  (!) 57   Resp: 17  16   Temp: 36.3 °C (97.3 °F)  36.2 °C (97.2 °F)   SpO2: 97%  96%   Weight:  76.6 kg (168 lb 14 oz)    Height: 1.854 m (6' 1\")         Final cultures:   Results     Procedure Component Value Units Date/Time    CHLAMYDIA/GC PCR URINE OR SWAB [[id number]]  (Abnormal) Collected:  10/05/18 1452    Order Status:  Completed Specimen:  Urine from Genital Updated:  10/07/18 1825     Source Urine     C. trachomatis by PCR POSITIVE (A)     N. gonorrhoeae by PCR Negative    URINE CULTURE(NEW) [[id number]] Collected:  10/05/18 1452    Order Status:  Completed Specimen:  Urine Updated:  10/07/18 0817     Significant Indicator NEG     Source UR     Site --     Urine Culture No growth at 48 hours.    URINALYSIS,CULTURE IF INDICATED [[id number]]  (Abnormal) Collected:  10/05/18 1452    Order Status:  Completed Specimen:  Urine from Urine, Cath Updated:  10/05/18 1512     Color Yellow     Character Clear     Specific Gravity 1.015     Ph 6.5     Glucose Negative mg/dL      Ketones Negative mg/dL      Protein Negative mg/dL      Bilirubin Negative     Urobilinogen, Urine 0.2     Nitrite Negative     Leukocyte Esterase Small (A)     Occult Blood Negative     Micro Urine Req Microscopic          Plan:   Chlamydia not currently being treated and was not treated in the ER.    Discussed result with the patient. Will call in new rx for azithromycin 1 g po to the [facility] on [address] He is aware he will need to remain abstinent for 7 days post treatment.  He was instructed to inform any partners within the past 60 days of the result so they can also get " tested and treatment. Was encouraged to follow up with the [facility] for the further information and testing.    Imani Wang

## 2018-12-10 ENCOUNTER — HOSPITAL ENCOUNTER (EMERGENCY)
Facility: MEDICAL CENTER | Age: 23
End: 2018-12-10
Payer: COMMERCIAL

## 2018-12-10 VITALS
HEART RATE: 89 BPM | RESPIRATION RATE: 14 BRPM | DIASTOLIC BLOOD PRESSURE: 73 MMHG | TEMPERATURE: 97.7 F | OXYGEN SATURATION: 99 % | SYSTOLIC BLOOD PRESSURE: 127 MMHG

## 2018-12-10 PROCEDURE — 302449 STATCHG TRIAGE ONLY (STATISTIC)

## 2019-01-04 ENCOUNTER — NON-PROVIDER VISIT (OUTPATIENT)
Dept: URGENT CARE | Facility: CLINIC | Age: 24
End: 2019-01-04

## 2019-01-04 DIAGNOSIS — Z02.1 PRE-EMPLOYMENT DRUG SCREENING: ICD-10-CM

## 2019-01-04 LAB
AMP AMPHETAMINE: NORMAL
COC COCAINE: NORMAL
INT CON NEG: NORMAL
INT CON POS: NORMAL
MET METHAMPHETAMINES: NORMAL
OPI OPIATES: NORMAL
PCP PHENCYCLIDINE: NORMAL
POC DRUG COMMENT 753798-OCCUPATIONAL HEALTH: NEGATIVE
THC: NORMAL

## 2019-01-04 PROCEDURE — 80305 DRUG TEST PRSMV DIR OPT OBS: CPT | Performed by: NURSE PRACTITIONER

## 2019-03-01 ENCOUNTER — NON-PROVIDER VISIT (OUTPATIENT)
Dept: OCCUPATIONAL MEDICINE | Facility: CLINIC | Age: 24
End: 2019-03-01

## 2019-03-01 DIAGNOSIS — Z02.1 PRE-EMPLOYMENT DRUG SCREENING: ICD-10-CM

## 2019-03-01 LAB
AMP AMPHETAMINE: NORMAL
COC COCAINE: NORMAL
INT CON NEG: NORMAL
INT CON POS: NORMAL
MET METHAMPHETAMINES: NORMAL
OPI OPIATES: NORMAL
PCP PHENCYCLIDINE: NORMAL
POC DRUG COMMENT 753798-OCCUPATIONAL HEALTH: NORMAL
THC: NORMAL

## 2019-03-01 PROCEDURE — 80305 DRUG TEST PRSMV DIR OPT OBS: CPT | Performed by: PREVENTIVE MEDICINE
